# Patient Record
Sex: MALE | Race: BLACK OR AFRICAN AMERICAN | NOT HISPANIC OR LATINO | URBAN - METROPOLITAN AREA
[De-identification: names, ages, dates, MRNs, and addresses within clinical notes are randomized per-mention and may not be internally consistent; named-entity substitution may affect disease eponyms.]

---

## 2017-01-17 ENCOUNTER — RECORDS - HEALTHEAST (OUTPATIENT)
Dept: GENERAL RADIOLOGY | Facility: CLINIC | Age: 32
End: 2017-01-17

## 2017-01-17 ENCOUNTER — OFFICE VISIT - HEALTHEAST (OUTPATIENT)
Dept: FAMILY MEDICINE | Facility: CLINIC | Age: 32
End: 2017-01-17

## 2017-01-17 DIAGNOSIS — J01.00 ACUTE MAXILLARY SINUSITIS, RECURRENCE NOT SPECIFIED: ICD-10-CM

## 2017-01-17 DIAGNOSIS — R05.9 COUGH: ICD-10-CM

## 2017-01-17 DIAGNOSIS — J02.9 SORE THROAT: ICD-10-CM

## 2017-01-17 RX ORDER — ALBUTEROL SULFATE 90 UG/1
2 AEROSOL, METERED RESPIRATORY (INHALATION) EVERY 4 HOURS PRN
Qty: 1 INHALER | Refills: 0 | Status: SHIPPED | OUTPATIENT
Start: 2017-01-17

## 2017-01-17 ASSESSMENT — MIFFLIN-ST. JEOR: SCORE: 2186.83

## 2018-10-10 ENCOUNTER — OFFICE VISIT - HEALTHEAST (OUTPATIENT)
Dept: FAMILY MEDICINE | Facility: CLINIC | Age: 33
End: 2018-10-10

## 2018-10-10 DIAGNOSIS — L02.92 RECURRENT BOILS: ICD-10-CM

## 2018-10-11 ENCOUNTER — COMMUNICATION - HEALTHEAST (OUTPATIENT)
Dept: INTERNAL MEDICINE | Facility: CLINIC | Age: 33
End: 2018-10-11

## 2018-10-11 LAB — BACTERIA SPEC CULT: NORMAL

## 2021-05-30 VITALS — WEIGHT: 264.1 LBS | BODY MASS INDEX: 35 KG/M2 | HEIGHT: 73 IN

## 2021-06-01 VITALS — WEIGHT: 262.1 LBS | BODY MASS INDEX: 34.58 KG/M2

## 2021-06-08 NOTE — PROGRESS NOTES
"Shalonda is a 31 y.o. male presenting to the clinic for concerns for cold symptoms for one month.  Patient complains of sinus congestion with purulent drainage, facial pain and pressure, headache, sore throat.  He felt feverish last night but did not take his temperature.  He complains of chest tightness, shortness of breath, and wheezing which worsened over the past week.  He denies any recent travel.  He denies history of asthma in the past but has used Ventolin inhalers in the past for bronchitis. No one else around him is ill.   He has beenusing over-the-counter TheraFlu and NyQuil.    Review of Systems: A complete 14 point review of systems was obtained and is negative or as stated in the history of present illness.    Social History     Social History     Marital status:      Spouse name: N/A     Number of children: N/A     Years of education: N/A     Occupational History     Not on file.     Social History Main Topics     Smoking status: Never Smoker     Smokeless tobacco: Not on file     Alcohol use No     Drug use: No     Sexual activity: Yes     Partners: Female     Other Topics Concern     Not on file     Social History Narrative     No narrative on file       Active Ambulatory Problems     Diagnosis Date Noted     No Active Ambulatory Problems     Resolved Ambulatory Problems     Diagnosis Date Noted     No Resolved Ambulatory Problems     No Additional Past Medical History       Family History   Problem Relation Age of Onset     Cervical cancer Mother      No Medical Problems Father      Cervical cancer Sister        OBJECTIVE:     Visit Vitals     /74 (Patient Site: Right Arm, Patient Position: Sitting, Cuff Size: Adult Large)     Pulse 91     Temp 98.4  F (36.9  C)     Ht 6' 1\" (1.854 m)     Wt (!) 264 lb 1.6 oz (119.8 kg)     SpO2 97%     BMI 34.84 kg/m2       Patient is alert, in no obvious distress.   Skin: Warm, dry.  No lesions or rashes.  Skin turgor rapid return.   HEENT:  Head " normocephalic, atraumatic.  Eyes normal. Ears normal.  Nose patent, mucosa red with purulent drainage.  Oropharynx erythematous.  No lesions or tonsillar enlargement.   Neck: Supple, no lymphadenopathy.  Lungs:  Clear to auscultation. Respirations even and unlabored.  No wheezing or rales noted.   Heart:  Regular rate and rhythm.  No murmurs.    LABORATORY: I ordered and personally reviewed a chest x-ray showing no obvious infiltrate.  Will have radiology review.      ASSESSMENT AND PLAN:     1. Acute maxillary sinusitis, recurrence not specified  Will treat with Doxycycline.  Educated on its indications and side effects. Patient is instructed to avoid sun exposure. Discussed symptomatic treatment including OTC nasal saline sprays.   - doxycycline (VIBRA-TABS) 100 MG tablet; Take 1 tablet (100 mg total) by mouth 2 (two) times a day for 10 days.  Dispense: 20 tablet; Refill: 0    2. Cough  Patient has complained of wheezing.  Will use ventolin as needed. Discussed proper use.   - XR Chest PA and Lateral; Future  - albuterol (PROVENTIL HFA;VENTOLIN HFA) 90 mcg/actuation inhaler; Inhale 2 puffs every 4 (four) hours as needed.  Dispense: 1 Inhaler; Refill: 0    3. Sore throat   Discussed symptomatic treatment. If no improvement with symptoms, the patient is to follow-up.  The patient is content with the plan.   - Rapid Strep A Screen-Throat  - Group A Strep, RNA Direct Detection, Throat

## 2021-08-22 ENCOUNTER — HEALTH MAINTENANCE LETTER (OUTPATIENT)
Age: 36
End: 2021-08-22

## 2021-10-17 ENCOUNTER — HEALTH MAINTENANCE LETTER (OUTPATIENT)
Age: 36
End: 2021-10-17

## 2022-10-01 ENCOUNTER — HEALTH MAINTENANCE LETTER (OUTPATIENT)
Age: 37
End: 2022-10-01

## 2023-10-21 ENCOUNTER — HEALTH MAINTENANCE LETTER (OUTPATIENT)
Age: 38
End: 2023-10-21